# Patient Record
Sex: MALE | Race: WHITE | NOT HISPANIC OR LATINO | Employment: OTHER | URBAN - METROPOLITAN AREA
[De-identification: names, ages, dates, MRNs, and addresses within clinical notes are randomized per-mention and may not be internally consistent; named-entity substitution may affect disease eponyms.]

---

## 2022-01-31 ENCOUNTER — TELEPHONE (OUTPATIENT)
Dept: PODIATRY | Facility: CLINIC | Age: 67
End: 2022-01-31
Payer: MEDICARE

## 2022-01-31 NOTE — TELEPHONE ENCOUNTER
----- Message from Jolly Dunne sent at 1/31/2022 10:37 AM CST -----  Regarding: medical advice  Contact: Patient  Patient want to speak with a nurse regarding foot pain and what he can take, please call back at [phone] (home)     Case number [id number]

## 2022-01-31 NOTE — TELEPHONE ENCOUNTER
Advised patient he could try OTC pain medication but since he has not been seen in the office a definite medication could not be recommended due to not know what exactly his issue is for foot discomfort. Patient verbalized understanding.

## 2022-02-08 ENCOUNTER — OFFICE VISIT (OUTPATIENT)
Dept: PODIATRY | Facility: CLINIC | Age: 67
End: 2022-02-08
Payer: MEDICARE

## 2022-02-08 VITALS
HEIGHT: 73 IN | BODY MASS INDEX: 39.76 KG/M2 | HEART RATE: 66 BPM | WEIGHT: 300 LBS | TEMPERATURE: 98 F | DIASTOLIC BLOOD PRESSURE: 77 MMHG | SYSTOLIC BLOOD PRESSURE: 145 MMHG

## 2022-02-08 DIAGNOSIS — M19.079 OSTEOARTHRITIS OF ANKLE AND FOOT, UNSPECIFIED LATERALITY: ICD-10-CM

## 2022-02-08 DIAGNOSIS — M20.21 HALLUX RIGIDUS OF BOTH FEET: Primary | ICD-10-CM

## 2022-02-08 DIAGNOSIS — M20.22 HALLUX RIGIDUS OF BOTH FEET: Primary | ICD-10-CM

## 2022-02-08 PROCEDURE — 99213 OFFICE O/P EST LOW 20 MIN: CPT | Mod: PBBFAC,PN | Performed by: PODIATRIST

## 2022-02-08 PROCEDURE — 99202 PR OFFICE/OUTPT VISIT, NEW, LEVL II, 15-29 MIN: ICD-10-PCS | Mod: S$PBB,,, | Performed by: PODIATRIST

## 2022-02-08 PROCEDURE — 99999 PR PBB SHADOW E&M-EST. PATIENT-LVL III: CPT | Mod: PBBFAC,,, | Performed by: PODIATRIST

## 2022-02-08 PROCEDURE — 99999 PR PBB SHADOW E&M-EST. PATIENT-LVL III: ICD-10-PCS | Mod: PBBFAC,,, | Performed by: PODIATRIST

## 2022-02-08 PROCEDURE — 99202 OFFICE O/P NEW SF 15 MIN: CPT | Mod: S$PBB,,, | Performed by: PODIATRIST

## 2022-02-08 RX ORDER — MELOXICAM 7.5 MG/1
1 TABLET ORAL 2 TIMES DAILY
COMMUNITY
Start: 2021-08-11 | End: 2022-08-10

## 2022-02-08 RX ORDER — COVID-19 MOLECULAR TEST ASSAY
KIT MISCELLANEOUS
COMMUNITY
Start: 2021-12-26

## 2022-02-08 RX ORDER — LISINOPRIL 40 MG/1
1 TABLET ORAL DAILY
COMMUNITY
Start: 2021-05-10 | End: 2022-05-09

## 2022-02-08 RX ORDER — CHLORTHALIDONE 25 MG/1
1 TABLET ORAL DAILY
COMMUNITY
Start: 2021-05-10 | End: 2022-05-09

## 2022-02-08 RX ORDER — DOXYCYCLINE HYCLATE 100 MG
1 TABLET ORAL 2 TIMES DAILY
COMMUNITY
Start: 2021-07-01 | End: 2022-06-30

## 2022-02-08 RX ORDER — DICLOFENAC SODIUM 10 MG/G
1 GEL TOPICAL
COMMUNITY
Start: 2021-04-27 | End: 2022-04-27

## 2022-02-08 RX ORDER — LIDOCAINE 50 MG/G
5 PATCH TOPICAL
COMMUNITY
Start: 2021-04-27 | End: 2022-04-27

## 2022-02-08 RX ORDER — TAMSULOSIN HYDROCHLORIDE 0.4 MG/1
1 CAPSULE ORAL DAILY
COMMUNITY
Start: 2021-08-16 | End: 2022-08-15

## 2022-02-09 PROBLEM — M19.079 OSTEOARTHRITIS OF ANKLE AND FOOT: Status: ACTIVE | Noted: 2022-02-09

## 2022-02-09 PROBLEM — M20.22 HALLUX RIGIDUS OF BOTH FEET: Status: ACTIVE | Noted: 2022-02-09

## 2022-02-09 PROBLEM — M20.21 HALLUX RIGIDUS OF BOTH FEET: Status: ACTIVE | Noted: 2022-02-09

## 2022-02-09 NOTE — PROGRESS NOTES
Subjective:       Patient ID: Arnold Alcaraz Jr. is a 67 y.o. male.    Chief Complaint: Follow-up, Diabetes Mellitus, Foot Problem, and Nail Problem  Patient presents today for a new patient evaluation he states he was recently seen at the [facility] advised that he had gout in the left big toe joint was started on colchicine.  Patient states he was told that his uric acid was elevated however he does not know the results of the lab.  Patient states he does not have a history of gout.    History reviewed. No pertinent past medical history.  Past Surgical History:   Procedure Laterality Date    TONSILLECTOMY       History reviewed. No pertinent family history.  Social History     Socioeconomic History    Marital status:    Tobacco Use    Smoking status: Former Smoker     Years: 25.00     Types: Cigarettes    Smokeless tobacco: Never Used   Substance and Sexual Activity    Alcohol use: Yes     Comment: 4-5 weekly     Drug use: No    Sexual activity: Not Currently     Partners: Female       Current Outpatient Medications   Medication Sig Dispense Refill    chlorthalidone (HYGROTEN) 25 MG Tab Take 1 tablet by mouth once daily.      diclofenac sodium (VOLTAREN) 1 % Gel Apply 1 % topically.      doxycycline (VIBRA-TABS) 100 MG tablet Take 1 tablet by mouth 2 (two) times daily.      LIDOcaine (LIDODERM) 5 % Apply 5 % topically.      lisinopriL (PRINIVIL,ZESTRIL) 40 MG tablet Take 1 tablet by mouth once daily.      meloxicam (MOBIC) 7.5 MG tablet Take 1 tablet by mouth 2 (two) times daily.      tamsulosin (FLOMAX) 0.4 mg Cap Take 1 capsule by mouth once daily.      ibuprofen (ADVIL,MOTRIN) 800 MG tablet Take 1 tablet (800 mg total) by mouth every 6 (six) hours as needed for Pain. 20 tablet 0    ID NOW COVID-19 TEST KIT Kit TEST AS DIRECTED TODAY       No current facility-administered medications for this visit.     Review of patient's allergies indicates:  No Known  "Allergies    Review of Systems   Musculoskeletal: Positive for arthralgias and joint swelling.   All other systems reviewed and are negative.      Objective:      Vitals:    02/08/22 1503   BP: (!) 145/77   Pulse: 66   Temp: 98 °F (36.7 °C)   Weight: 136.1 kg (300 lb)   Height: 6' 1" (1.854 m)     Physical Exam  Vitals and nursing note reviewed.   Constitutional:       Appearance: Normal appearance.   Cardiovascular:      Pulses:           Dorsalis pedis pulses are 1+ on the right side and 1+ on the left side.        Posterior tibial pulses are 1+ on the right side and 1+ on the left side.   Pulmonary:      Effort: Pulmonary effort is normal.   Musculoskeletal:         General: Swelling, tenderness and deformity present.      Right foot: Decreased range of motion. Deformity present.      Left foot: Decreased range of motion. Deformity present.        Feet:    Feet:      Right foot:      Protective Sensation: 2 sites tested. 2 sites sensed.      Toenail Condition: Fungal disease present.     Left foot:      Protective Sensation: 2 sites tested. 2 sites sensed.      Toenail Condition: Fungal disease present.  Skin:     General: Skin is warm.      Capillary Refill: Capillary refill takes more than 3 seconds.      Findings: Erythema present.   Neurological:      General: No focal deficit present.      Mental Status: He is alert.   Psychiatric:         Mood and Affect: Mood normal.         Behavior: Behavior normal.         Thought Content: Thought content normal.         Judgment: Judgment normal.              Assessment:       1. Hallux rigidus of both feet    2. Osteoarthritis of ankle and foot, unspecified laterality        Plan:       Patient presents today for a new patient evaluation he states he was recently seen at the [facility] emergency department advised that he had gout in the left big toe joint was started on colchicine.  Patient states he was told that his uric acid was elevated however he does not know " the results of the lab.  Patient states he does not have a history of gout.  On evaluation patient is noted to have significant decreased range of motion 1st MPJ bilateral with hallux rigidus he also has degenerative spurring encompassing the 1st MPJ bilateral this is most prominent overlying the 1st MPJ right.  Patient has no history of gout he states he started having pain in his big toe joint he took colchicine for 2 days and this subsequently got better I advised the patient typically gout last for up to 2 weeks and I feel that this is most likely inflammation related to the degenerative arthritis not likely gout especially because I do not have lab results that support a gout attack.  Patient was in understanding and agreement with everything discussed I have recommended the use of Voltaren gel around the joint to help settle down inflammation this is typically very good for degenerative arthritic joint.  I have recommended the application of Vicks vapor rub to the patient's toenails twice a day every day to help with the fungal infection I have also recommended filing the nails a couple times a week patient wanted to make sure that was okay to get a pedicure with his recent inflammation.  I have recommended that the patient start applying urea cream twice a day every day he does have areas of callus formation surrounding both big toes I advised him the 40% urea cream will help to breakdown the callus tissue and minimize the buildup of the callus.  Recommended follow-up as needed patient advised if he does have another flare-up in the big toe joint to contact us immediately we would work him in for a closer evaluation and likely uric acid blood test.  Recommended follow-up needed.This note was created using [employer] voice recognition software that occasionally misinterpreted phrases or words.